# Patient Record
Sex: FEMALE | Race: NATIVE HAWAIIAN OR OTHER PACIFIC ISLANDER | URBAN - METROPOLITAN AREA
[De-identification: names, ages, dates, MRNs, and addresses within clinical notes are randomized per-mention and may not be internally consistent; named-entity substitution may affect disease eponyms.]

---

## 2017-08-16 PROBLEM — Z00.00 ENCOUNTER FOR PREVENTIVE HEALTH EXAMINATION: Status: ACTIVE | Noted: 2017-08-16

## 2017-10-25 ENCOUNTER — OUTPATIENT (OUTPATIENT)
Dept: OUTPATIENT SERVICES | Facility: HOSPITAL | Age: 62
LOS: 1 days | End: 2017-10-25
Payer: COMMERCIAL

## 2017-10-25 ENCOUNTER — APPOINTMENT (OUTPATIENT)
Dept: ULTRASOUND IMAGING | Facility: CLINIC | Age: 62
End: 2017-10-25
Payer: COMMERCIAL

## 2017-10-25 DIAGNOSIS — M25.50 PAIN IN UNSPECIFIED JOINT: ICD-10-CM

## 2017-10-25 PROCEDURE — 76881 US COMPL JOINT R-T W/IMG: CPT

## 2017-10-25 PROCEDURE — 76881 US COMPL JOINT R-T W/IMG: CPT | Mod: 26,LT

## 2017-11-03 ENCOUNTER — APPOINTMENT (OUTPATIENT)
Dept: NEUROLOGY | Facility: CLINIC | Age: 62
End: 2017-11-03
Payer: COMMERCIAL

## 2017-11-03 VITALS
HEIGHT: 58 IN | BODY MASS INDEX: 23.93 KG/M2 | HEART RATE: 97 BPM | DIASTOLIC BLOOD PRESSURE: 60 MMHG | SYSTOLIC BLOOD PRESSURE: 100 MMHG | TEMPERATURE: 98.2 F | OXYGEN SATURATION: 98 % | WEIGHT: 114 LBS

## 2017-11-03 DIAGNOSIS — Z86.79 PERSONAL HISTORY OF OTHER DISEASES OF THE CIRCULATORY SYSTEM: ICD-10-CM

## 2017-11-03 DIAGNOSIS — Z86.69 PERSONAL HISTORY OF OTHER DISEASES OF THE NERVOUS SYSTEM AND SENSE ORGANS: ICD-10-CM

## 2017-11-03 DIAGNOSIS — Z84.1 FAMILY HISTORY OF DISORDERS OF KIDNEY AND URETER: ICD-10-CM

## 2017-11-03 DIAGNOSIS — Z86.39 PERSONAL HISTORY OF OTHER ENDOCRINE, NUTRITIONAL AND METABOLIC DISEASE: ICD-10-CM

## 2017-11-03 DIAGNOSIS — F41.9 ANXIETY DISORDER, UNSPECIFIED: ICD-10-CM

## 2017-11-03 DIAGNOSIS — Z80.9 FAMILY HISTORY OF MALIGNANT NEOPLASM, UNSPECIFIED: ICD-10-CM

## 2017-11-03 DIAGNOSIS — Z78.9 OTHER SPECIFIED HEALTH STATUS: ICD-10-CM

## 2017-11-03 DIAGNOSIS — Z82.61 FAMILY HISTORY OF ARTHRITIS: ICD-10-CM

## 2017-11-03 DIAGNOSIS — Z83.3 FAMILY HISTORY OF DIABETES MELLITUS: ICD-10-CM

## 2017-11-03 DIAGNOSIS — Z82.5 FAMILY HISTORY OF ASTHMA AND OTHER CHRONIC LOWER RESPIRATORY DISEASES: ICD-10-CM

## 2017-11-03 DIAGNOSIS — Z87.898 PERSONAL HISTORY OF OTHER SPECIFIED CONDITIONS: ICD-10-CM

## 2017-11-03 DIAGNOSIS — Z87.39 PERSONAL HISTORY OF OTHER DISEASES OF THE MUSCULOSKELETAL SYSTEM AND CONNECTIVE TISSUE: ICD-10-CM

## 2017-11-03 PROCEDURE — 99205 OFFICE O/P NEW HI 60 MIN: CPT

## 2017-11-03 RX ORDER — TRIAMCINOLONE ACETONIDE 1 MG/G
0.1 OINTMENT TOPICAL
Refills: 0 | Status: ACTIVE | COMMUNITY
Start: 2017-11-03

## 2017-11-03 RX ORDER — MECLIZINE HYDROCHLORIDE 25 MG/1
25 TABLET ORAL 3 TIMES DAILY
Qty: 90 | Refills: 2 | Status: ACTIVE | COMMUNITY
Start: 2017-11-03 | End: 1900-01-01

## 2017-11-06 LAB
ANION GAP SERPL CALC-SCNC: 16 MMOL/L
BUN SERPL-MCNC: 14 MG/DL
CALCIUM SERPL-MCNC: 9.8 MG/DL
CHLORIDE SERPL-SCNC: 99 MMOL/L
CO2 SERPL-SCNC: 25 MMOL/L
CREAT SERPL-MCNC: 0.83 MG/DL
GLUCOSE SERPL-MCNC: 90 MG/DL
POTASSIUM SERPL-SCNC: 4.9 MMOL/L
SODIUM SERPL-SCNC: 140 MMOL/L

## 2018-01-22 ENCOUNTER — APPOINTMENT (OUTPATIENT)
Dept: NEUROLOGY | Facility: CLINIC | Age: 63
End: 2018-01-22
Payer: COMMERCIAL

## 2018-01-22 VITALS
TEMPERATURE: 97.8 F | WEIGHT: 110 LBS | DIASTOLIC BLOOD PRESSURE: 76 MMHG | SYSTOLIC BLOOD PRESSURE: 114 MMHG | BODY MASS INDEX: 23.09 KG/M2 | HEIGHT: 58 IN | HEART RATE: 76 BPM

## 2018-01-22 DIAGNOSIS — R90.89 OTHER ABNORMAL FINDINGS ON DIAGNOSTIC IMAGING OF CENTRAL NERVOUS SYSTEM: ICD-10-CM

## 2018-01-22 DIAGNOSIS — R42 DIZZINESS AND GIDDINESS: ICD-10-CM

## 2018-01-22 PROCEDURE — 99214 OFFICE O/P EST MOD 30 MIN: CPT

## 2018-01-22 RX ORDER — MECLIZINE HYDROCHLORIDE 25 MG/1
25 TABLET ORAL 3 TIMES DAILY
Qty: 180 | Refills: 5 | Status: ACTIVE | COMMUNITY
Start: 2018-01-22 | End: 1900-01-01

## 2018-12-04 ENCOUNTER — RESULT REVIEW (OUTPATIENT)
Age: 63
End: 2018-12-04

## 2019-08-19 ENCOUNTER — RESULT REVIEW (OUTPATIENT)
Age: 64
End: 2019-08-19

## 2019-08-27 ENCOUNTER — APPOINTMENT (OUTPATIENT)
Dept: SURGERY | Facility: CLINIC | Age: 64
End: 2019-08-27
Payer: COMMERCIAL

## 2019-08-27 VITALS
DIASTOLIC BLOOD PRESSURE: 86 MMHG | TEMPERATURE: 97.8 F | HEART RATE: 74 BPM | HEIGHT: 58 IN | WEIGHT: 110 LBS | BODY MASS INDEX: 23.09 KG/M2 | SYSTOLIC BLOOD PRESSURE: 125 MMHG

## 2019-08-27 DIAGNOSIS — D05.12 INTRADUCTAL CARCINOMA IN SITU OF LEFT BREAST: ICD-10-CM

## 2019-08-27 PROCEDURE — 99204 OFFICE O/P NEW MOD 45 MIN: CPT

## 2019-08-27 NOTE — PLAN
[FreeTextEntry1] : Had  a long d/w the patient. She has newly diagnosed breast cancer- L. Breast/DCIS. All the options, benefits and risks were discussed. The options between breast conservation versus mastectomy were discussed. Lumpectomy followed by radiation therapy versus mastectomy with or without reconstruction were discussed. The potential complications including infection, bleeding, upper extremity swelling, recurrence and other complications were also discussed. \par She would need pre op needle localization followed by lumpectomy of the L. breast. \par All questions answered. She had several pages of questions\par \par Patient wants to go for a second opinion at this time. She will call back if she decides to schedule for surgery. \par \par \par

## 2019-08-27 NOTE — REVIEW OF SYSTEMS
[Fever] : no fever [Loss Of Hearing] : no hearing loss [Chills] : no chills [Chest Pain] : no chest pain [Shortness Of Breath] : no shortness of breath [Lower Ext Edema] : no lower extremity edema [Cough] : no cough [Wheezing] : no wheezing [Abdominal Pain] : no abdominal pain [Skin Lesions] : no skin lesions [Pelvic Pain] : no pelvic pain [Skin Wound] : no skin wound [Breast Lump] : no breast lump [Anxiety] : no anxiety [Dizziness] : no dizziness [Muscle Weakness] : no muscle weakness [Swollen Glands] : no swollen glands [de-identified] : s/p Breast BX of the R. and L. side; L. breast c/w DCIS ; has psoriasis

## 2019-08-27 NOTE — DATA REVIEWED
[FreeTextEntry1] : Patient: PEDRO NAVA\par YOB: 1955\par Phone: [phone]\par MRN: [id number] Acc: [id number]\par Date of Exam: 08-\par  \par EXAM: ULTRASOUND BREAST BILATERAL COMPLETE\par \par HISTORY: The patient is 63 years old and is seen for follow-up lesions, lump and discomfort 12 o'clock left breast. There is no personal history of breast cancer. Family history of breast cancer: Yes.\par \par TECHNIQUE: A bilateral breast ultrasound was performed with complete evaluation of the four quadrants, retroareolar regions, and axillae.\par \par COMPARISON: Mammogram/ultrasound January and February 2019, ultrasound July 2018\par \par FINDINGS: Heterogeneous echotexture diffuse dense glandular parenchyma. Benign-appearing axillary nodes.\par \par Right: 5 o'clock N 2 cm, 5 x 2 x 4 mm circumscribed hypoechoic nodule. 6 o'clock N 2 cm, stable 8 x 3 x 7 mm circumscribed hypoechoic nodule. 10 o'clock N 2 cm, increased 11 x 5 x 7 mm poorly circumscribed hypoechoic avascular nodule, indeterminate. 10 o'clock N 6 cm, 6 x 3 x 6 mm hypoechoic avascular nodule. 11 o'clock N 2 cm, stable 7 x 4 x 6 mm and 4 x 3 x 4 mm hypoechoic nodules.\par \par Left:  12 o'clock N 2 cm, palpable 8 x 8 x 8 mm poorly circumscribed hypoechoic avascular nodule, indeterminate. 12 o'clock N 0 cm, 5 x 3 x 5 mm complicated cyst. 1 o'clock N 3 cm, stable 6 x 4 x 6 mm circumscribed hypoechoic nodule. 2 o'clock N 2 cm, 3 x 2 x 3 mm complicated cyst.\par \par IMPRESSION: \par 1. Right 10 o'clock N 2 cm, increased 11 mm indeterminate nodule.\par 2. Left 12 o'clock palpable 8 mm indeterminate nodule.\par \par FOLLOW-UP: Needle biopsy.\par Ultrasound-guided biopsy, right 10 o'clock and left 12 o'clock.\par \par GERSON MOHAN MD was notified by telephone of these results at 8/9/2019 11:26 AM.\par \par ASSESSMENT: BI-RADS Category 4:  Suspicious.\par \par \par \par Patient: PEDRO NAVA\par YOB: 1955\par Phone: [phone]\par MRN: [id number] Acc: [id number]\par Date of Exam: 08-\par  \par Addendum 1 - 08-\par  \par Addendum:\par \par Pathology results from [facility]:\par \par \par 1. Right 10 o'clock sono-guided biopsy: Benign breast tissue showing a fibroadenomatoid nodule.\par 2. Left 12 o'clock sono-guided biopsy: Ductal carcinoma in situ, micropapillary pattern with intermediate grade nuclear atypia.\par Microcalcifications are present in DCIS.\par Uninvolved breast tissue shows proliferative fibrocystic disease.\par \par The pathology results are concordant with the imaging. \par \par Follow-up recommendations: Surgical/oncologic consultation for definitive treatment assessment.\par \par Breast MRI with and without contrast is recommended as part of the patient's treatment planning.\par \par GERSON MOHAN MD was notified by telephone of these results at 8/22/2019 9:57 AM.\par \par Results were also forwarded to the ordering physician by the department staff.\par \par Thank you for the opportunity to participate in the care of this patient.  \par  \par Kendrick Elizondo MD  - Electronically Signed: 08- 9:57 AM \par Physician to Physician Direct Line is: [phone]\par Original Report\par EXAM:  ULTRASOUND-GUIDED CORE BIOPSY 2 SITES\par \par HISTORY: 63 years old patient is seen for ultrasound-guided breast biopsy of lesion(s) seen on sonography:\par \par 1. Right 10 o'clock N 2 cm, 1.1 cm nodule.\par 2. Left 12 o'clock N 2 cm, 1.6 cm mass.\par \par COMPARISON: Mammogram January 2019, ultrasound August 2019 \par \par BIOPSY NEEDLE UTILIZED: 14-gauge spring-loaded biopsy needle\par \par PROCEDURE: The patient had been instructed to stop blood thinning agents for 7 days prior to the biopsy. The patient's medical history was reviewed. The procedure was explained to the patient (utilizing a  if necessary), discussing risks including but not limited to bleeding and infection. All of the patient's questions were answered. The patient agreed to have the procedure performed by Dr. Elizondo, and a signed consent was obtained.\par \par Timeout was observed to confirm the patient's identity and the correct lesion(s). With the assistance of a technologist, the first lesion was localized prior to and during the procedure. The skin was cleaned in a sterile manner, and the tract anesthetized with lidocaine. Serial images documented the needle prior to and sampling the target lesion. A cylinder-shape tissue marker was then deployed at the right 10 o'clock site. \par \par Subsequent lesion(s) was/were sampled in a similar fashion using separate needles a cylinder shape marker was deployed at the left 12 o'clock site. All samples were kept apart and carefully labeled so as to avoid confusion and contamination.\par \par Postbiopsy mammogram: Right posterior depth upper outer quadrant cylinder-shaped marker. Left posterior depth 12 o'clock cylinder-shaped marker.\par \par Following completion of the procedure, manual compression of the site(s) was gently applied for hemostasis. No significant complications were observed by the staff or described by the patient. The patient was instructed in wound care, and told to contact the referring physician for results and follow-up.\par \par Concordance and results will follow via addendum once the pathology is received.\par \par IMPRESSION: As above.\par \par Thank you for the opportunity to participate in the care of this patient.  \par  \par Kendrick Elizondo MD  - Electronically Signed: 08- 5:32 PM \par Physician to Physician Direct Line is: [phone]\par Copy to:LEXA URIBE MD\par Exam requested by:GERSON MOHAN MD\par \par

## 2019-08-27 NOTE — HISTORY OF PRESENT ILLNESS
[de-identified] : Patient is a 63 y.o F who presents for a consultation visit, recently diagnosed with L. breast CA. Patient had bilateral breast US, 08/09/19, which had showed: \par 1. Right 10 o'clock N 2 cm, increased 11 mm indeterminate nodule.\par 2. Left 12 o'clock palpable 8 mm indeterminate nodule.    BI RADS cat 4 finding. \par US guided core BX of 2 sites , L. breast was done, 08/19/19 , path results c/w : \par 1. Right 10 o'clock: Benign breast tissue showing a fibroadenomatoid nodule.\par 2. Left 12 o'clock sono -guided biopsy: Ductal carcinoma in situ, micropapillary pattern with intermediate grade nuclear atypia. Microcalcifications are present in DCIS.\par The pathology results are concordant with imaging. \par \par Fam Hx: Mother - Glioma/passed away. Had End Stage Breast CA, s/p mastectomy (Age 55-56) \par She denies feeling any masses or lumps, denies nipple discharge\par Has some post biopsy tenderness to breasts bilaterally.

## 2019-08-27 NOTE — PHYSICAL EXAM
[Normal Breath Sounds] : Normal breath sounds [Alert] : alert [Normal Rate and Rhythm] : normal rate and rhythm [Oriented to Person] : oriented to person [Oriented to Place] : oriented to place [Oriented to Time] : oriented to time [Calm] : calm [de-identified] : A/Ox3, NAD [de-identified] : EOMI, sclera anicteric  [de-identified] : supple, no JVD [de-identified] : abd is soft, NT/ND [de-identified] : small LN felt to the L. axilla ; post biopsy changes noted to the L. and R. breasts, no palpable masses or lumps felt to either side, no nipple discharge  [de-identified] : has psoriatic plaques/hyperpigmentation to the LE, bilaterally  [de-identified] : full ROM, no joint deformity

## 2020-02-06 ENCOUNTER — RESULT REVIEW (OUTPATIENT)
Age: 65
End: 2020-02-06

## 2021-04-30 ENCOUNTER — APPOINTMENT (OUTPATIENT)
Dept: PULMONOLOGY | Facility: CLINIC | Age: 66
End: 2021-04-30
Payer: MEDICARE

## 2021-04-30 VITALS
HEIGHT: 58 IN | WEIGHT: 101 LBS | HEART RATE: 82 BPM | SYSTOLIC BLOOD PRESSURE: 91 MMHG | TEMPERATURE: 98 F | BODY MASS INDEX: 21.2 KG/M2 | OXYGEN SATURATION: 97 % | RESPIRATION RATE: 16 BRPM | DIASTOLIC BLOOD PRESSURE: 68 MMHG

## 2021-04-30 DIAGNOSIS — R93.89 ABNORMAL FINDINGS ON DIAGNOSTIC IMAGING OF OTHER SPECIFIED BODY STRUCTURES: ICD-10-CM

## 2021-04-30 PROCEDURE — 99204 OFFICE O/P NEW MOD 45 MIN: CPT

## 2021-04-30 PROCEDURE — 99072 ADDL SUPL MATRL&STAF TM PHE: CPT

## 2021-05-01 NOTE — HISTORY OF PRESENT ILLNESS
[Never] : never [TextBox_4] : She is a 65-year-old woman. She has been complaining of left-sided chest discomfort. She underwent a left breast lumpectomy in 2019 and completed radiation for it. She feels that they may be damaged her left lung. She denies any cough or dyspnea at rest but has some dyspnea on exertion.\par \par She has a history of a positive IGRA for TB. She has no personal history of tuberculosis or known exposure to tuberculosis. She was born in the [facility] and came to the [place] in [place].

## 2021-05-01 NOTE — REVIEW OF SYSTEMS
[SOB on Exertion] : sob on exertion [Chest Discomfort] : chest discomfort [Fever] : no fever [Chills] : no chills [Epistaxis] : no epistaxis [Nasal Congestion] : no nasal congestion [Postnasal Drip] : no postnasal drip [Cough] : no cough [Hemoptysis] : no hemoptysis [Nasal Discharge] : no nasal discharge [GERD] : no gerd [Back Pain] : no back pain [Anemia] : no anemia [Rash] : no rash [Headache] : no headache [Depression] : no depression [Diabetes] : no diabetes

## 2021-05-01 NOTE — DISCUSSION/SUMMARY
[FreeTextEntry1] : She is a 65-year-old woman. She has been complaining of left-sided chest discomfort. She underwent a left breast lumpectomy in 2019 and completed radiation for it. She feels that they may be damaged her left lung. She denies any cough or dyspnea at rest but has some dyspnea on exertion.\par \par She has a history of a positive IGRA for TB. She has no personal history of tuberculosis or known exposure to tuberculosis. She was born in the [facility] and came to the [place] in [place].\par \par There is a possibility that she may have a mild radiation-induced pneumonitis. A CT of the chest was requested.\par \par There is no evidence of any active tuberculosis. Prophylaxis was not recommended at this time. This may become necessary at a later date depending on her clinical condition.

## 2021-06-25 ENCOUNTER — APPOINTMENT (OUTPATIENT)
Dept: PULMONOLOGY | Facility: CLINIC | Age: 66
End: 2021-06-25
Payer: MEDICARE

## 2021-06-25 VITALS
RESPIRATION RATE: 16 BRPM | BODY MASS INDEX: 20.9 KG/M2 | TEMPERATURE: 97 F | HEART RATE: 88 BPM | WEIGHT: 100 LBS | SYSTOLIC BLOOD PRESSURE: 102 MMHG | DIASTOLIC BLOOD PRESSURE: 62 MMHG | OXYGEN SATURATION: 100 %

## 2021-06-25 DIAGNOSIS — R91.1 SOLITARY PULMONARY NODULE: ICD-10-CM

## 2021-06-25 PROCEDURE — 99213 OFFICE O/P EST LOW 20 MIN: CPT

## 2021-06-26 PROBLEM — R91.1 PULMONARY NODULE: Status: ACTIVE | Noted: 2021-06-26

## 2021-06-26 NOTE — DISCUSSION/SUMMARY
[FreeTextEntry1] : She is a 65 year-old woman. She had been complaining of left-sided chest discomfort. She underwent a left breast lumpectomy in 2019 and completed radiation for it. She has a history of a positive IGRA for TB. She has no personal history of tuberculosis or known exposure to tuberculosis. She was born in the [facility] and came to the [place] in [place].\par \par CT Chest 6/15/21: 5.5 mm calcified nodule in the LLL. No adenopathy. \par \par The chest pain is intermittent with an atypical pattern. There is no evidence of radiation-induced pneumonitis. The pulmonary status is stable. Discussed CT findings with her. No further action needed now. Should follow up with her PCP. \par \par Follow up in 6 months.

## 2021-06-26 NOTE — PROCEDURE
[FreeTextEntry1] : Chest x-ray 12/28/20: No active disease. \par \par CT Chest 6/15/21: 5.5 mm calcified nodule in the LLL. No adenopathy.

## 2021-06-26 NOTE — REASON FOR VISIT
[Shortness of Breath] : shortness of breath [Follow-Up] : a follow-up visit [Abnormal CXR/ Chest CT] : an abnormal CXR/ chest CT

## 2021-06-26 NOTE — PHYSICAL EXAM
[No Acute Distress] : no acute distress [No Neck Mass] : no neck mass [Normal S1, S2] : normal s1, s2 [Clear to Auscultation Bilaterally] : clear to auscultation bilaterally [Normal Gait] : normal gait [No Edema] : no edema [No Clubbing] : no clubbing [Normal Turgor] : normal turgor [No Focal Deficits] : no focal deficits [Oriented x3] : oriented x3

## 2021-06-26 NOTE — HISTORY OF PRESENT ILLNESS
[Never] : never [TextBox_4] : She is a 65 year-old woman. She has been complaining of left-sided chest discomfort on and off. She underwent a left breast lumpectomy in 2019 and completed radiation for it. She has a history of a positive IGRA for TB. She has no personal history of tuberculosis or known exposure to tuberculosis. She was born in the [facility] and came to the [place] in [place].\par \par She denies any cough or dyspnea at rest but has some dyspnea on exertion.\par \par

## 2021-06-26 NOTE — REVIEW OF SYSTEMS
[SOB on Exertion] : sob on exertion [Chest Discomfort] : chest discomfort [Fever] : no fever [Epistaxis] : no epistaxis [Nasal Congestion] : no nasal congestion [Postnasal Drip] : no postnasal drip [Cough] : no cough [Hemoptysis] : no hemoptysis [Wheezing] : no wheezing [Sputum] : no sputum [Nasal Discharge] : no nasal discharge [GERD] : no gerd [Back Pain] : no back pain [Rash] : no rash [Headache] : no headache [Anemia] : no anemia [Depression] : no depression [Diabetes] : no diabetes